# Patient Record
Sex: MALE | Race: BLACK OR AFRICAN AMERICAN | NOT HISPANIC OR LATINO | URBAN - METROPOLITAN AREA
[De-identification: names, ages, dates, MRNs, and addresses within clinical notes are randomized per-mention and may not be internally consistent; named-entity substitution may affect disease eponyms.]

---

## 2019-11-03 ENCOUNTER — EMERGENCY (EMERGENCY)
Facility: HOSPITAL | Age: 19
LOS: 0 days | Discharge: HOME | End: 2019-11-03
Attending: STUDENT IN AN ORGANIZED HEALTH CARE EDUCATION/TRAINING PROGRAM
Payer: COMMERCIAL

## 2019-11-03 VITALS
DIASTOLIC BLOOD PRESSURE: 78 MMHG | HEART RATE: 76 BPM | SYSTOLIC BLOOD PRESSURE: 137 MMHG | RESPIRATION RATE: 16 BRPM | OXYGEN SATURATION: 99 %

## 2019-11-03 VITALS
HEIGHT: 69 IN | HEART RATE: 62 BPM | SYSTOLIC BLOOD PRESSURE: 120 MMHG | TEMPERATURE: 98 F | WEIGHT: 164.91 LBS | DIASTOLIC BLOOD PRESSURE: 76 MMHG | OXYGEN SATURATION: 98 % | RESPIRATION RATE: 16 BRPM
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
## 2019-11-03 DIAGNOSIS — M54.5 LOW BACK PAIN: ICD-10-CM
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
## 2019-11-03 LAB
APPEARANCE UR: CLEAR — SIGNIFICANT CHANGE UP
BILIRUB UR-MCNC: NEGATIVE — SIGNIFICANT CHANGE UP
COLOR SPEC: YELLOW — SIGNIFICANT CHANGE UP
DIFF PNL FLD: NEGATIVE — SIGNIFICANT CHANGE UP
GLUCOSE UR QL: NEGATIVE MG/DL — SIGNIFICANT CHANGE UP
KETONES UR-MCNC: NEGATIVE — SIGNIFICANT CHANGE UP
LEUKOCYTE ESTERASE UR-ACNC: NEGATIVE — SIGNIFICANT CHANGE UP
NITRITE UR-MCNC: NEGATIVE — SIGNIFICANT CHANGE UP
PH UR: 6.5 — SIGNIFICANT CHANGE UP (ref 5–8)
PROT UR-MCNC: NEGATIVE MG/DL — SIGNIFICANT CHANGE UP
SP GR SPEC: 1.02 — SIGNIFICANT CHANGE UP (ref 1.01–1.02)
UROBILINOGEN FLD QL: NEGATIVE MG/DL — SIGNIFICANT CHANGE UP

## 2019-11-03 PROCEDURE — 99283 EMERGENCY DEPT VISIT LOW MDM: CPT

## 2019-11-03 RX ORDER — CYCLOBENZAPRINE HYDROCHLORIDE 10 MG/1
1 TABLET, FILM COATED ORAL
Qty: 9 | Refills: 0
Start: 2019-11-03 | End: 2019-11-05

## 2019-11-03 RX ORDER — KETOROLAC TROMETHAMINE 30 MG/ML
15 SYRINGE (ML) INJECTION ONCE
Refills: 0 | Status: DISCONTINUED | OUTPATIENT
Start: 2019-11-03 | End: 2019-11-03

## 2019-11-03 RX ORDER — IBUPROFEN 200 MG
1 TABLET ORAL
Qty: 12 | Refills: 0
Start: 2019-11-03 | End: 2019-11-05

## 2019-11-03 RX ORDER — DIAZEPAM 5 MG
5 TABLET ORAL ONCE
Refills: 0 | Status: DISCONTINUED | OUTPATIENT
Start: 2019-11-03 | End: 2019-11-03

## 2019-11-03 RX ADMIN — Medication 15 MILLIGRAM(S): at 01:13

## 2019-11-03 RX ADMIN — Medication 5 MILLIGRAM(S): at 01:13

## 2019-11-03 RX ADMIN — Medication 15 MILLIGRAM(S): at 02:30

## 2019-11-03 NOTE — ED PROVIDER NOTE - PATIENT PORTAL LINK FT
You can access the FollowMyHealth Patient Portal offered by [facility] by registering at the following website: [email or web address]. By joining [employer]’s FollowMyHealth portal, you will also be able to view your health information using other applications (apps) compatible with our system.

## 2019-11-03 NOTE — ED PROVIDER NOTE - PHYSICAL EXAMINATION
Gen: no acute distress, well appearing, awake, alert and oriented x 3  Cardiac: regular rate and rhythm, +S1S2  Pulm: Clear to auscultation bilaterally  Abd: soft, nontender, nondistended, no guarding  Back: neg CVA ttp, nontender spine, + ttp with muscle hypertonicity right low back, no ecchymosis appreciated  Extremity: no edema, no deformity, warm and well perfused, FROM all extremities    Neuro: awake, alert, oriented x 3, sensorimotor intact

## 2019-11-03 NOTE — ED PROVIDER NOTE - NSFOLLOWUPINSTRUCTIONS_ED_ALL_ED_FT
Please return to Emergency Department immediately for any new, concerning, or worsening symptoms.   Please follow-up with PMD as recommended.    Please take prescriptions as discussed.

## 2019-11-03 NOTE — ED PROVIDER NOTE - OBJECTIVE STATEMENT
20 yo male with no PMH presents to ED for evaluation of right sided low back pain. Reports he was playing football today and another player ran into his back. states he felt a lot of pain. Denies sensorimotor deficits. Denies bowel/bladder incontinence or retention. Denies saddle anesthesia. has not taken any medication prior to arrival. Denies hematuria. Denies head injury or LOC. Denies any other injuries. Not on AC

## 2019-11-03 NOTE — ED ADULT NURSE NOTE - NSIMPLEMENTINTERV_GEN_ALL_ED
60
Implemented All Universal Safety Interventions:  [place] to call system. Call bell, personal items and telephone within reach. Instruct patient to call for assistance. Room bathroom lighting operational. Non-slip footwear when patient is off stretcher. Physically safe environment: no spills, clutter or unnecessary equipment. Stretcher in lowest position, wheels locked, appropriate side rails in place.
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
## 2019-11-03 NOTE — ED PROVIDER NOTE - CARE PROVIDER_API CALL
Twan Ivan)  Orthopaedic Surgery  [address], [address]  [place] [address]  Phone: [phone]  Fax: [phone]  Follow Up Time: 1-3 Days

## 2019-11-06 ENCOUNTER — APPOINTMENT (OUTPATIENT)
Dept: ORTHOPEDIC SURGERY | Facility: CLINIC | Age: 19
End: 2019-11-06
Payer: COMMERCIAL

## 2019-11-06 VITALS
SYSTOLIC BLOOD PRESSURE: 127 MMHG | HEART RATE: 90 BPM | HEIGHT: 69 IN | WEIGHT: 155 LBS | DIASTOLIC BLOOD PRESSURE: 81 MMHG | BODY MASS INDEX: 22.96 KG/M2

## 2019-11-06 DIAGNOSIS — M62.830 MUSCLE SPASM OF BACK: ICD-10-CM

## 2019-11-06 PROCEDURE — 99204 OFFICE O/P NEW MOD 45 MIN: CPT
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
## 2019-11-06 NOTE — RETURN TO WORK/SCHOOL
[FreeTextEntry1] : Yelitza was seen today for evaluation of low back pain. He is permitted to return to work full duty without restrictions on 11/10/19.\par Thank you for your understanding.\par \par Sincerely,\par \par Ervin Arias DO, ATC\par Primary Care Sports Medicine\par [facility] [place]\par

## 2019-11-06 NOTE — PHYSICAL EXAM
[de-identified] : Constitutional: Well-nourished, well-developed, No acute distress\par Respiratory:  Good respiratory effort, no SOB\par Lymphatic: No regional lymphadenopathy, no lymphedema\par Psychiatric: Pleasant and normal affect, alert and oriented x3\par Skin: Clean dry and intact B/L UE/LE\par Musculoskeletal: normal except where as noted in regional exam\par \par Lumbar Spine Exam\par \par APPEARANCE: no marked deformities or malalignment, normal curvature of the lumbosacral spine. no marked deformities. good posture\par POSITIVE TENDERNESS: + Right lumbar tenderness and spasm noted in erector spinae and quadratus lumborum\par NONTENDER: no bony midline tenderness, no marked tenderness in left lumbar musculature.\par ROM: full, although painful at end range of flexion and extension\par RESISTIVE TESTING: painless 5/5 resisted flex/ext, sidebending b/l, and rotation\par SPECIAL TESTS: neg SLR b/l, neg HAWK b/l, neg Trendelenburg b/l \par PULSES: 2+ DP/PT pulses\par NEURO:  L1 - S2 intact to sensation and motor, DTRs 2+/4 patella and achilles\par \par B/L Hips: No asymmetry, malalignment, or swelling, Full ROM, 5/5 strength in flexion/ext, IR/ER, Abd/Add, Joints stable\par B/L Knees: No asymmetry, malalignment, or swelling, Full ROM, 5/5 strength in flexion/ext, Joints stable\par B/L Ankles: No asymmetry, malalignment, or swelling, Full ROM, 5/5 strength in DF/PF/Inv/Ev, Joints stable\par BIOMECHANICAL EXAM: no marked leg length discrepancy, mild hip abductor weakness b/l, no marked foot pronation, Normal gait and station\par \par \par

## 2019-11-06 NOTE — DISCUSSION/SUMMARY
[de-identified] : Discussed findings of today's exam and possible causes of patient's pain.  Educated patient on their most probable diagnosis of right lumbar spasm secondary to direct contusion playing football.  Reviewed possible courses of treatment, and we collaboratively decided best course of treatment at this time will include conservative management. Patient will continue with oral NSAIDs and muscle relaxants as provided by the ER.  Patient will be started on a course of physical therapy to restore normal range of motion and strength as tolerated. Patient works at [employer], he does repetitive lifting throughout the day, he is requesting to be out of work until Sunday.  Follow up as needed.  Patient appreciates and agrees with current plan.\par \par This note was generated using dragon medical dictation software.  A reasonable effort has been made for proofreading its contents, but typos may still remain.  If there are any questions or points of clarification needed please notify my office.

## 2025-03-14 ENCOUNTER — EMERGENCY (EMERGENCY)
Facility: HOSPITAL | Age: 25
LOS: 1 days | Discharge: HOME | End: 2025-03-14
Admitting: EMERGENCY MEDICINE
Payer: COMMERCIAL

## 2025-03-14 VITALS
HEART RATE: 78 BPM | RESPIRATION RATE: 18 BRPM | OXYGEN SATURATION: 98 % | SYSTOLIC BLOOD PRESSURE: 127 MMHG | WEIGHT: 169.98 LBS | DIASTOLIC BLOOD PRESSURE: 73 MMHG | TEMPERATURE: 97 F

## 2025-03-14 PROBLEM — Z78.9 OTHER SPECIFIED HEALTH STATUS: Chronic | Status: ACTIVE | Noted: 2019-11-03

## 2025-03-14 PROCEDURE — 99283 EMERGENCY DEPT VISIT LOW MDM: CPT
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
## 2025-03-14 NOTE — ED PROVIDER NOTE - PROGRESS NOTE DETAILS
RENU HARDING:  Pt medically stable for discharge.  Strict return precautions given. Pt to follow up with PMD and OMFS or plastic surgery (referral list provided).

## 2025-03-14 NOTE — ED PROVIDER NOTE - PATIENT PORTAL LINK FT
You can access the FollowMyHealth Patient Portal offered by [facility] by registering at the following website: [email or web address]. By joining [employer]’s FollowMyHealth portal, you will also be able to view your health information using other applications (apps) compatible with our system.

## 2025-03-14 NOTE — ED PROVIDER NOTE - NOSE FINDINGS
no septal hematoma; +swelling and mild tenderness at bridge of nose; dried blood in bilateral nares; no active drainage

## 2025-03-14 NOTE — ED PROVIDER NOTE - NSFOLLOWUPINSTRUCTIONS_ED_ALL_ED_FT
Advance activity as tolerated.  Continue all previously prescribed medications as directed unless otherwise instructed.  Take Tylenol 650mg (Two 325 mg pills) every 4-6 hours as needed for pain.  Cleanse abrasion daily with soap and tepid water then dab dry thoroughly.  Apply bacitracin (available over the counter) twice daily to affected area until healed.  Follow up with your primary care physician and oral surgery (call [phone]) or plastic surgery  in 48-72 hours- bring copies of your results.  Return to the Emergency Department for worsening or persistent symptoms, including but not limited to worsening/persistent headache, numbness, weakness, changes in vision or hearing, dizziness, difficulty walking, falls, slurred speech, neck stiffness, fevers, lightheadedness, nasal discharge/bleeding and/or ANY NEW OR CONCERNING SYMPTOMS. If you have issues obtaining follow up, please call: [phone] to obtain a doctor or specialist who takes your insurance in your area.  You may call [phone] to make an appointment with the internal medicine clinic.     Do not blow your nose. If you have a runny nose please wipe it gently. Do not use a straw for drinking. Do not smoke.  Try to avoid sneezing. If you feel the urge to sneeze, do so with your mouth open- do  NOT block the sneeze by pinching your nose. Avoid swimming, scuba diving, playing a wind instrument, blowing up balloons, or other things that cause pressure changes in your mouth/head. Avoid bending over- try to keep your head above the level of your heart Sleep with your head slightly raised. Do not strain by pushing or lifting heavy objects.       NASAL FRACTURE - Discharge Care    Nasal Fracture    WHAT YOU NEED TO KNOW:    A nasal fracture is a crack or break in your nose. You may have a break in the upper nose (bridge), the side, or the septum. The septum is in the middle of the nose and divides your nostrils.    DISCHARGE INSTRUCTIONS:    Seek care immediately if:    You feel like one or both of your nasal passages are blocked and you have trouble breathing.    Clear fluid is leaking from your nose.    You have severe nose pain, even after you take medicine.    You have double vision or have problems moving your eyes.  Call your doctor if:    You have a fever.    You continue to have nosebleeds.    You have a headache that gets worse, even after you take pain medicine.    Your splint or packing is loose.    You have questions or concerns about your condition or care.  Medicines:    Medicine may be given to decrease pain or help prevent a bacterial infection. Ask how to take pain medicine safely. Medicine may also be given to decrease nasal swelling and help make breathing easier.    Take your medicine as directed. Contact your healthcare provider if you think your medicine is not helping or if you have side effects. Tell your provider if you are allergic to any medicine. Keep a list of the medicines, vitamins, and herbs you take. Include the amounts, and when and why you take them. Bring the list or the pill bottles to follow-up visits. Carry your medicine list with you in case of an emergency.  Wound care: Ask your healthcare provider how to care for your wounds, splint, or packing.    How to care for your nasal fracture:    Apply ice on your nose for 15 to 20 minutes every hour or as directed. Use an ice pack, or put crushed ice in a plastic bag. Cover it with a towel. Ice helps prevent tissue damage and decreases swelling and pain.    Elevate your head when you lie down. This will help decrease swelling and pain. You may need to see a specialist 3 to 5 days later for tests or more treatment after swelling has gone down.  Elevate Head (Adult)      Protect your nose to prevent bleeding, bruising, or another fracture. Try not to bump your nose on anything. You may not be able to play sports for up to 6 weeks.  Follow up with a specialist or your doctor in 2 to 5 days as directed: Write down any questions you have so you remember to ask them during your visits. Sometimes follow-up care is needed months or even years later to correct problems.    © [employer] 1973, 2025    ABRASION - General Information    Abrasion    WHAT YOU NEED TO KNOW:    What is an abrasion? An abrasion is a wound on your skin. Abrasions usually happen when your skin rubs against a rough surface. Examples of an abrasion include rug burn, a skinned elbow, or road rash. Abrasions can be deep or shallow The wound may hurt, bleed, bruise, or swell.    How can I care for my abrasion?    Wash your hands and dry them with a clean towel first.    Press a clean cloth against your wound for 5 to 10 minutes to stop any bleeding.    Rinse your wound with clean water. Do not use harsh soap, alcohol, or iodine solutions.    Use a clean, wet cloth to remove any objects, such as small pieces of rocks or dirt.    Rub antibiotic ointment on your wound. This may help prevent infection and help your wound heal.    Cover the wound with a non-stick bandage. Change the bandage daily, and if it gets wet or dirty.  When should I seek immediate care?    The bleeding does not stop after 10 minutes of firm pressure.    The redness around your wound begins to spread.    You cannot rinse one or more foreign objects out of your wound.  When should I call my doctor?    You have a fever or chills.    Your abrasion is red, warm, swollen, or draining pus.    You have questions or concerns about your condition or care.  CARE AGREEMENT:    You have the right to help plan your care. Learn about your health condition and how it may be treated. Discuss treatment options with your healthcare providers to decide what care you want to receive. You always have the right to refuse treatment.    © [employer] 1973, 2023
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
